# Patient Record
Sex: MALE | Race: AMERICAN INDIAN OR ALASKA NATIVE
[De-identification: names, ages, dates, MRNs, and addresses within clinical notes are randomized per-mention and may not be internally consistent; named-entity substitution may affect disease eponyms.]

---

## 2019-02-08 ENCOUNTER — HOSPITAL ENCOUNTER (EMERGENCY)
Dept: HOSPITAL 5 - ED | Age: 84
Discharge: HOME | End: 2019-02-08
Payer: MEDICARE

## 2019-02-08 VITALS — DIASTOLIC BLOOD PRESSURE: 79 MMHG | SYSTOLIC BLOOD PRESSURE: 136 MMHG

## 2019-02-08 DIAGNOSIS — E11.9: ICD-10-CM

## 2019-02-08 DIAGNOSIS — K59.00: ICD-10-CM

## 2019-02-08 DIAGNOSIS — I10: ICD-10-CM

## 2019-02-08 DIAGNOSIS — K52.9: Primary | ICD-10-CM

## 2019-02-08 LAB
ALBUMIN SERPL-MCNC: 4 G/DL (ref 3.9–5)
ALT SERPL-CCNC: 18 UNITS/L (ref 7–56)
BASOPHILS # (AUTO): 0.1 K/MM3 (ref 0–0.1)
BASOPHILS NFR BLD AUTO: 0.5 % (ref 0–1.8)
BILIRUB UR QL STRIP: (no result)
BLOOD UR QL VISUAL: (no result)
BUN SERPL-MCNC: 12 MG/DL (ref 9–20)
BUN/CREAT SERPL: 11 %
CALCIUM SERPL-MCNC: 9.4 MG/DL (ref 8.4–10.2)
EOSINOPHIL # BLD AUTO: 0.1 K/MM3 (ref 0–0.4)
EOSINOPHIL NFR BLD AUTO: 0.6 % (ref 0–4.3)
HCT VFR BLD CALC: 46 % (ref 35.5–45.6)
HEMOLYSIS INDEX: 14
HGB BLD-MCNC: 15.2 GM/DL (ref 11.8–15.2)
LYMPHOCYTES # BLD AUTO: 1.4 K/MM3 (ref 1.2–5.4)
LYMPHOCYTES NFR BLD AUTO: 14.1 % (ref 13.4–35)
MCHC RBC AUTO-ENTMCNC: 33 % (ref 32–34)
MCV RBC AUTO: 86 FL (ref 84–94)
MONOCYTES # (AUTO): 0.6 K/MM3 (ref 0–0.8)
MONOCYTES % (AUTO): 5.7 % (ref 0–7.3)
MUCOUS THREADS #/AREA URNS HPF: (no result) /HPF
PH UR STRIP: 6 [PH] (ref 5–7)
PLATELET # BLD: 304 K/MM3 (ref 140–440)
PROT UR STRIP-MCNC: (no result) MG/DL
RBC # BLD AUTO: 5.35 M/MM3 (ref 3.65–5.03)
RBC #/AREA URNS HPF: < 1 /HPF (ref 0–6)
UROBILINOGEN UR-MCNC: < 2 MG/DL (ref ?–2)
WBC #/AREA URNS HPF: 5 /HPF (ref 0–6)

## 2019-02-08 PROCEDURE — 96361 HYDRATE IV INFUSION ADD-ON: CPT

## 2019-02-08 PROCEDURE — 81001 URINALYSIS AUTO W/SCOPE: CPT

## 2019-02-08 PROCEDURE — 36415 COLL VENOUS BLD VENIPUNCTURE: CPT

## 2019-02-08 PROCEDURE — 99284 EMERGENCY DEPT VISIT MOD MDM: CPT

## 2019-02-08 PROCEDURE — 85025 COMPLETE CBC W/AUTO DIFF WBC: CPT

## 2019-02-08 PROCEDURE — 96360 HYDRATION IV INFUSION INIT: CPT

## 2019-02-08 PROCEDURE — 74177 CT ABD & PELVIS W/CONTRAST: CPT

## 2019-02-08 PROCEDURE — 80053 COMPREHEN METABOLIC PANEL: CPT

## 2019-02-08 PROCEDURE — 70450 CT HEAD/BRAIN W/O DYE: CPT

## 2019-02-08 NOTE — EMERGENCY DEPARTMENT REPORT
HPI





- General


Chief Complaint: Abdominal Pain


Time Seen by Provider: 19 10:39





- HPI


HPI: 





Room 22





The patient is an 83-year-old male presents with a chief complaint of 

constipation and dizziness.  The patient states he has not had a bowel movement 

for 7 days.  The patient states this morning he drank an herbal supplement but 

it has not helped.  Patient is passing flatus but also missed occasional nausea 

and vomiting.  Patient denies any history of fever.  Patient states 

approximately 3 weeks ago he tripped and fell injuring his head.  Patient states

he's had intermittent dizziness since the fall





Location: Gastrointestinal system, see above


Duration: [See above]


Quality: Constipation


Severity: Moderate


Modifying factors: [see above]


Context: [see above]


Mode of transportation: [not driving]





ED Past Medical Hx





- Past Medical History


Previous Medical History?: Yes


Hx Hypertension: Yes


Hx Diabetes: Yes





- Surgical History


Additional Surgical History: Cataract surgery





- Family History


Family history: no significant





- Social History


Smoking Status: Never Smoker


Substance Use Type: None





- Medications


Home Medications: 


                                Home Medications











 Medication  Instructions  Recorded  Confirmed  Last Taken  Type


 


Ciprofloxacin HCl [Ciprofloxacin 500 mg PO Q12H #14 tab 19  Unknown Rx





TAB]     


 


Docusate Sodium [Colace] 100 mg PO BID PRN #30 capsule 19  Unknown Rx


 


Lactulose [Cephulac] 20 gm PO QDAY PRN #90 ml 19  Unknown Rx














ED Review of Systems


ROS: 


Stated complaint: ABD PAIN/DIZZY


Other details as noted in HPI





Constitutional: denies: fever


Eyes: denies: eye pain


ENT: denies: throat pain


Respiratory: no symptoms reported


Cardiovascular: denies: chest pain


Endocrine: no symptoms reported


Gastrointestinal: abdominal pain, nausea, vomiting, constipation


Genitourinary: denies: dysuria


Musculoskeletal: denies: back pain


Neurological: other (dizziness).  denies: headache





Physical Exam





- Physical Exam


Vital Signs: 


                                   Vital Signs











  19





  10:27


 


Respiratory 12





Rate 











Physical Exam: 





GENERAL: The patient is well-developed well-nourished male lying on stretcher 

not appearing to be in acute distress. []


HEENT: Normocephalic.  Atraumatic.  Extraocular motions are intact.  Patient has

 moist mucous membranes.


NECK: Supple.  Trachea midline


CHEST/LUNGS: Clear to auscultation.  There is no respiratory distress noted.


HEART/CARDIOVASCULAR: Regular.  There is no tachycardia.  There is no gallop rub

 or murmur.


ABDOMEN: Abdomen is soft, nontender.  Patient has normal bowel sounds.  There is

 no abdominal distention.


SKIN: There is no rash.  There is no edema.  There is no diaphoresis.


NEURO: The patient is awake, alert, and oriented.  The patient is cooperative.  

The patient has no focal neurologic deficits.  The patient has normal speech.  

Cranial nerves II through XII grossly intact, motor


MUSCULOSKELETAL:  There is no evidence of acute injury.





ED Course


                                   Vital Signs











  19





  10:27


 


Respiratory 12





Rate 














- Reevaluation(s)


Reevaluation #1: 





19 14:55


Patient states he feels improved.  Patient states he's had 3 bowel movements in 

the ED





ED Medical Decision Making





- Lab Data


Result diagrams: 


                                 19 10:45





                                 19 10:45





                                Laboratory Tests











  19





  10:45 10:45 13:55


 


WBC  10.2  


 


RBC  5.35 H  


 


Hgb  15.2  


 


Hct  46.0 H  


 


MCV  86  


 


MCH  29  


 


MCHC  33  


 


RDW  14.1  


 


Plt Count  304  


 


Lymph % (Auto)  14.1  


 


Mono % (Auto)  5.7  


 


Eos % (Auto)  0.6  


 


Baso % (Auto)  0.5  


 


Lymph #  1.4  


 


Mono #  0.6  


 


Eos #  0.1  


 


Baso #  0.1  


 


Seg Neutrophils %  79.1 H  


 


Seg Neutrophils #  8.1 H  


 


Sodium   141 


 


Potassium   4.0 


 


Chloride   103.7 


 


Carbon Dioxide   23 


 


Anion Gap   18 


 


BUN   12 


 


Creatinine   1.1 


 


Estimated GFR   > 60 


 


BUN/Creatinine Ratio   11 


 


Glucose   176 H 


 


Calcium   9.4 


 


Total Bilirubin   1.10 


 


AST   18 


 


ALT   18 


 


Alkaline Phosphatase   63 


 


Total Protein   7.3 


 


Albumin   4.0 


 


Albumin/Globulin Ratio   1.2 


 


Urine Color    Yellow


 


Urine Turbidity    Clear


 


Urine pH    6.0


 


Ur Specific Gravity    1.046 H


 


Urine Protein    <15 mg/dl


 


Urine Glucose (UA)    Neg


 


Urine Ketones    20


 


Urine Blood    Neg


 


Urine Nitrite    Neg


 


Urine Bilirubin    Neg


 


Urine Urobilinogen    < 2.0


 


Ur Leukocyte Esterase    Tr


 


Urine WBC (Auto)    5.0


 


Urine RBC (Auto)    < 1.0


 


Urine Mucus    Few














- Radiology Data


Radiology results: report reviewed (CT abdomen and pelvis, CT head), image 

reviewed (CT abdomen and pelvis, CT head)





[address] [place] [address] 





Cat Scan Report 


Signed 





Patient: KARELY CROCKETT MR#: [id number] 


: 1935 Acct:[id number] 


Age/Sex: 83 / M ADM Date: 19 


Loc: ED 


Attending Dr: 








Ordering Physician: SANTOS SHAH MD 


Date of Service: 19 


Procedure(s): CT abdomen pelvis w con 


Accession Number(s): [id number] 





cc: SANTOS SHAH MD 








CT ABDOMEN PELVIS WITH CONTRAST: 





HISTORY: Diffuse abdominal pain, constipation. 





COMPARISON: none. 





TECHNIQUE: Helical CT in 1.25mm intervals following IV contrast. 


Sagittal and coronal reconstructions. 








FINDINGS: 





Lung bases: A small hiatal hernia is identified. The visualized lung 


bases are adequately aerated. Normal heart size. 





Liver: Normal. 





Biliary system: Normal. 





Pancreas: Normal. 





Spleen: Normal. 





Kidneys/ureters/bladder: There are multiple bilateral renal cysts. The 


largest cyst measures 5.7 cm in the superior left kidney. The 


collecting systems and bladder are unremarkable. There is mild 


enlargement of the prostate gland. 





Adrenal glands: Normal. 





Aorta: Normal. 





Intestines: No oral contrast was administered which limits this exam. 


There appears to be mild circumferential thickening of the ascending, 


transverse and descending colon. The cecum is fluid filled. There are 


numerous diverticula in the sigmoid colon. No evidence for acute 


diverticulitis. The small bowel loops and stomach are unremarkable. 





Appendix: Not confidently identified, correlate with surgical history. 





Ascites: None. 





Adenopathy: None. 





Musculoskeletal: Intact. Mild lumbar spondylosis and mild osteopenia 


are noted. 








IMPRESSION: 


Consider a nonspecific colitis, see above. 


Small hiatal hernia. 


Bilateral simple renal cysts. 


Mild enlargement of the prostate gland. 


Sigmoid diverticulosis. 





Transcribed By: TTR 


Dictated By: ELLIE BLACK JR, MD 


Electronically Authenticated By: ELLIE BLACK JR, MD 


Signed Date/Time: 19 132 











DD/DT: 19 1318 


TD/TT: 19 1321





[facility] 


[address] [place] [address] 





Cat Scan Report 


Signed 





Patient: KARELY CROCKETT MR#: [id number] 


: 1935 Acct:[id number] 


Age/Sex: 83 / M ADM Date: 19 


Loc: ED 


Attending Dr: 








Ordering Physician: SANTOS SHAH MD 


Date of Service: 19 


Procedure(s): CT head/brain wo con 


Accession Number(s): [id number] 





cc: SANTOS SHAH MD 








FINAL REPORT 





EXAM: CT HEAD/BRAIN WO CON 





HISTORY: intermittent dizziness ?3 weeks after fall 





TECHNIQUE: CT examination of the head without IV contrast 





PRIORS: None. 





FINDINGS: 


No acute air-fluid level visualized in the included air-filled sinuses. 





Bone windows demonstrate no acute fracture. 





There is ventricular and sulcal prominence compatible with global 

cerebrocortical atrophy. 





The brain contains no mass, mass effect, hemorrhage, or acute infarct. 





There is no extra-axial intracranial bleed, brain bleed, or midline shift. 





IMPRESSION: 


No acute CVA, intracranial bleed, or brain mass 











Transcribed By: BAL 


Dictated By: AMINA SIMON MD 


Electronically Authenticated By: AMINA SIMON MD 


Signed Date/Time: 19 1511 











DD/DT: 19 1509 


TD/TT: 19 1509





- Differential Diagnosis


constipation, SBO, ICH,


Critical care attestation.: 


If time is entered above; I have spent that time in minutes in the direct care 

of this critically ill patient, excluding procedure time.








ED Disposition


Clinical Impression: 


 Abdominal pain, Constipation, Colitis





Disposition: - TO HOME OR SELFCARE


Is pt being admited?: No


Does the pt Need Aspirin: No


Condition: Stable


Instructions:  Infectious Colitis (ED)


Additional Instructions: 


Return to the emergency department immediately should you develop worsening 

symptoms, fever, inability to tolerate food or liquid or any other concerns.


Prescriptions: 


Ciprofloxacin HCl [Ciprofloxacin TAB] 500 mg PO Q12H #14 tab


Docusate Sodium [Colace] 100 mg PO BID PRN #30 capsule


 PRN Reason: Constipation


Lactulose [Cephulac] 20 gm PO QDAY PRN #90 ml


 PRN Reason: Constipation


Referrals: 


SHIRA LARRY MD [Primary Care Provider] - 3-5 Days


ANYI BRANTLEY MD [Staff Physician] - 3-5 Days (Dr. Brantley is a 

gastroenterologist.  Please follow up with him for further evaluation)


Time of Disposition: 15:12

## 2019-02-08 NOTE — CAT SCAN REPORT
FINAL REPORT



EXAM:  CT HEAD/BRAIN WO CON



HISTORY:  intermittent dizziness ?3 weeks after fall 



TECHNIQUE:  CT examination of the head without IV contrast



PRIORS:  None.



FINDINGS:  

No acute air-fluid level visualized in the included air-filled sinuses. 



Bone windows demonstrate no acute fracture. 



There is ventricular and sulcal prominence compatible with global cerebrocortical atrophy. 



The brain contains no mass, mass effect, hemorrhage, or acute infarct. 



There is no extra-axial intracranial bleed, brain bleed, or midline shift.



IMPRESSION:  

No acute CVA, intracranial bleed, or brain mass

## 2019-02-08 NOTE — CAT SCAN REPORT
CT ABDOMEN PELVIS WITH CONTRAST:



HISTORY:  Diffuse abdominal pain, constipation.



COMPARISON: none.



TECHNIQUE:  Helical CT in 1.25mm intervals following IV contrast. 

Sagittal and coronal reconstructions. 





FINDINGS:



Lung bases: A small hiatal hernia is identified. The visualized lung 

bases are adequately aerated. Normal heart size.



Liver: Normal.



Biliary system: Normal.



Pancreas: Normal.



Spleen: Normal.



Kidneys/ureters/bladder: There are multiple bilateral renal cysts. The 

largest cyst measures 5.7 cm in the superior left kidney. The 

collecting systems and bladder are unremarkable. There is mild 

enlargement of the prostate gland.



Adrenal glands: Normal.



Aorta: Normal.



Intestines: No oral contrast was administered which limits this exam. 

There appears to be mild circumferential thickening of the ascending, 

transverse and descending colon. The cecum is fluid filled. There are 

numerous diverticula in the sigmoid colon. No evidence for acute 

diverticulitis. The small bowel loops and stomach are unremarkable.



Appendix: Not confidently identified, correlate with surgical history.



Ascites: None.



Adenopathy: None.



Musculoskeletal: Intact. Mild lumbar spondylosis and mild osteopenia 

are noted.





IMPRESSION:

Consider a nonspecific colitis, see above.

Small hiatal hernia.

Bilateral simple renal cysts.

Mild enlargement of the prostate gland.

Sigmoid diverticulosis.

## 2019-02-09 ENCOUNTER — HOSPITAL ENCOUNTER (EMERGENCY)
Dept: HOSPITAL 5 - ED | Age: 84
Discharge: HOME | End: 2019-02-09
Payer: MEDICARE

## 2019-02-09 VITALS — DIASTOLIC BLOOD PRESSURE: 74 MMHG | SYSTOLIC BLOOD PRESSURE: 133 MMHG

## 2019-02-09 DIAGNOSIS — K92.1: Primary | ICD-10-CM

## 2019-02-09 DIAGNOSIS — E11.9: ICD-10-CM

## 2019-02-09 DIAGNOSIS — I10: ICD-10-CM

## 2019-02-09 LAB
ALBUMIN SERPL-MCNC: 3.8 G/DL (ref 3.9–5)
ALT SERPL-CCNC: 16 UNITS/L (ref 7–56)
APTT BLD: 30.4 SEC. (ref 24.2–36.6)
BASOPHILS # (AUTO): 0.1 K/MM3 (ref 0–0.1)
BASOPHILS NFR BLD AUTO: 0.8 % (ref 0–1.8)
BUN SERPL-MCNC: 12 MG/DL (ref 9–20)
BUN/CREAT SERPL: 13 %
CALCIUM SERPL-MCNC: 9.1 MG/DL (ref 8.4–10.2)
EOSINOPHIL # BLD AUTO: 0.1 K/MM3 (ref 0–0.4)
EOSINOPHIL NFR BLD AUTO: 1.4 % (ref 0–4.3)
HCT VFR BLD CALC: 44 % (ref 35.5–45.6)
HEMOLYSIS INDEX: 18
HGB BLD-MCNC: 15.1 GM/DL (ref 11.8–15.2)
INR PPP: 1.06 (ref 0.87–1.13)
LYMPHOCYTES # BLD AUTO: 1.3 K/MM3 (ref 1.2–5.4)
LYMPHOCYTES NFR BLD AUTO: 18.1 % (ref 13.4–35)
MCHC RBC AUTO-ENTMCNC: 34 % (ref 32–34)
MCV RBC AUTO: 85 FL (ref 84–94)
MONOCYTES # (AUTO): 0.6 K/MM3 (ref 0–0.8)
MONOCYTES % (AUTO): 7.7 % (ref 0–7.3)
PLATELET # BLD: 321 K/MM3 (ref 140–440)
RBC # BLD AUTO: 5.18 M/MM3 (ref 3.65–5.03)

## 2019-02-09 PROCEDURE — 85025 COMPLETE CBC W/AUTO DIFF WBC: CPT

## 2019-02-09 PROCEDURE — 83690 ASSAY OF LIPASE: CPT

## 2019-02-09 PROCEDURE — 85610 PROTHROMBIN TIME: CPT

## 2019-02-09 PROCEDURE — 86900 BLOOD TYPING SEROLOGIC ABO: CPT

## 2019-02-09 PROCEDURE — 36415 COLL VENOUS BLD VENIPUNCTURE: CPT

## 2019-02-09 PROCEDURE — 82271 OCCULT BLOOD OTHER SOURCES: CPT

## 2019-02-09 PROCEDURE — 86850 RBC ANTIBODY SCREEN: CPT

## 2019-02-09 PROCEDURE — 85730 THROMBOPLASTIN TIME PARTIAL: CPT

## 2019-02-09 PROCEDURE — 93005 ELECTROCARDIOGRAM TRACING: CPT

## 2019-02-09 PROCEDURE — 86901 BLOOD TYPING SEROLOGIC RH(D): CPT

## 2019-02-09 PROCEDURE — 93010 ELECTROCARDIOGRAM REPORT: CPT

## 2019-02-09 PROCEDURE — 99284 EMERGENCY DEPT VISIT MOD MDM: CPT

## 2019-02-09 PROCEDURE — 80053 COMPREHEN METABOLIC PANEL: CPT
